# Patient Record
Sex: MALE | Race: NATIVE HAWAIIAN OR OTHER PACIFIC ISLANDER | URBAN - METROPOLITAN AREA
[De-identification: names, ages, dates, MRNs, and addresses within clinical notes are randomized per-mention and may not be internally consistent; named-entity substitution may affect disease eponyms.]

---

## 2021-10-14 ENCOUNTER — APPOINTMENT (RX ONLY)
Dept: URBAN - METROPOLITAN AREA CLINIC 49 | Facility: CLINIC | Age: 41
Setting detail: DERMATOLOGY
End: 2021-10-14

## 2021-10-14 VITALS — HEIGHT: 70 IN | WEIGHT: 210 LBS

## 2021-10-14 DIAGNOSIS — L259 CONTACT DERMATITIS AND OTHER ECZEMA, UNSPECIFIED CAUSE: ICD-10-CM | Status: INADEQUATELY CONTROLLED

## 2021-10-14 DIAGNOSIS — L71.8 OTHER ROSACEA: ICD-10-CM | Status: INADEQUATELY CONTROLLED

## 2021-10-14 PROBLEM — L30.8 OTHER SPECIFIED DERMATITIS: Status: ACTIVE | Noted: 2021-10-14

## 2021-10-14 PROCEDURE — ? COUNSELING

## 2021-10-14 PROCEDURE — ? PRESCRIPTION

## 2021-10-14 PROCEDURE — ? PRESCRIPTION MEDICATION MANAGEMENT

## 2021-10-14 PROCEDURE — ? TREATMENT REGIMEN

## 2021-10-14 PROCEDURE — 99204 OFFICE O/P NEW MOD 45 MIN: CPT

## 2021-10-14 RX ORDER — DOXYCYCLINE 40 MG/1
CAPSULE ORAL
Qty: 30 | Refills: 1 | Status: ERX | COMMUNITY
Start: 2021-10-14

## 2021-10-14 RX ORDER — HALOBETASOL PROPIONATE 0.5 MG/G
LOTION TOPICAL
Qty: 60 | Refills: 1 | Status: ERX | COMMUNITY
Start: 2021-10-14

## 2021-10-14 RX ORDER — IVERMECTIN 10 MG/G
CREAM TOPICAL
Qty: 45 | Refills: 2 | Status: ERX | COMMUNITY
Start: 2021-10-14

## 2021-10-14 RX ADMIN — IVERMECTIN: 10 CREAM TOPICAL at 00:00

## 2021-10-14 RX ADMIN — DOXYCYCLINE: 40 CAPSULE ORAL at 00:00

## 2021-10-14 RX ADMIN — HALOBETASOL PROPIONATE: 0.5 LOTION TOPICAL at 00:00

## 2021-10-14 ASSESSMENT — LOCATION SIMPLE DESCRIPTION DERM
LOCATION SIMPLE: LEFT CALF
LOCATION SIMPLE: NOSE
LOCATION SIMPLE: RIGHT PRETIBIAL REGION
LOCATION SIMPLE: LEFT PRETIBIAL REGION
LOCATION SIMPLE: RIGHT KNEE
LOCATION SIMPLE: LEFT KNEE
LOCATION SIMPLE: RIGHT CALF
LOCATION SIMPLE: LEFT CHEEK
LOCATION SIMPLE: RIGHT CHEEK

## 2021-10-14 ASSESSMENT — LOCATION DETAILED DESCRIPTION DERM
LOCATION DETAILED: LEFT KNEE
LOCATION DETAILED: RIGHT KNEE
LOCATION DETAILED: LEFT DISTAL CALF
LOCATION DETAILED: RIGHT DISTAL CALF
LOCATION DETAILED: LEFT CENTRAL MALAR CHEEK
LOCATION DETAILED: NASAL DORSUM
LOCATION DETAILED: LEFT PROXIMAL PRETIBIAL REGION
LOCATION DETAILED: RIGHT CENTRAL MALAR CHEEK
LOCATION DETAILED: RIGHT PROXIMAL PRETIBIAL REGION

## 2021-10-14 ASSESSMENT — LOCATION ZONE DERM
LOCATION ZONE: NOSE
LOCATION ZONE: FACE
LOCATION ZONE: LEG

## 2021-10-14 NOTE — PROCEDURE: TREATMENT REGIMEN
Otc Regimen: We discussed avoiding hot water, washing with unscented bar soap, and moisturizing especially after bathing - he is currently doing all of these things
Detail Level: Zone

## 2021-10-14 NOTE — HPI: RASH
Is This A New Presentation, Or A Follow-Up?: Rash
Additional History: Patient states he has a history of eczema. He moisturizers with CeraVe, avoids hot water and uses Dove unscented soap for cleansing.

## 2021-10-14 NOTE — PROCEDURE: PRESCRIPTION MEDICATION MANAGEMENT
Initiate Treatment: Ultravate 0.05 % lotion BID x 2 weeks on 2 weeks off PRN to affected areas
Samples Given: Ultravate lotion
Plan: If there is no improvement with new treatment regimen after 2 weeks of use, then patient will return to office for a possible biopsy. Patient is agreeable with plan
Detail Level: Zone
Render In Strict Bullet Format?: No
Plan: Follow-up in 2 months
Samples Given: Oracea, Soolantra
Initiate Treatment: Soolantra 1 % topical cream QHS to face\\nOracea 40 mg capsule PO QD x 2 months